# Patient Record
Sex: FEMALE | Race: WHITE | HISPANIC OR LATINO | Employment: UNEMPLOYED | URBAN - METROPOLITAN AREA
[De-identification: names, ages, dates, MRNs, and addresses within clinical notes are randomized per-mention and may not be internally consistent; named-entity substitution may affect disease eponyms.]

---

## 2023-09-27 ENCOUNTER — HOSPITAL ENCOUNTER (OUTPATIENT)
Facility: CLINIC | Age: 28
End: 2023-09-27
Admitting: OBSTETRICS & GYNECOLOGY
Payer: MEDICAID

## 2023-09-27 ENCOUNTER — HOSPITAL ENCOUNTER (EMERGENCY)
Facility: CLINIC | Age: 28
End: 2023-09-27

## 2023-09-27 ENCOUNTER — HOSPITAL ENCOUNTER (OUTPATIENT)
Facility: CLINIC | Age: 28
Discharge: HOME | End: 2023-09-27
Attending: FAMILY MEDICINE | Admitting: OBSTETRICS & GYNECOLOGY
Payer: MEDICAID

## 2023-09-27 VITALS
OXYGEN SATURATION: 100 % | HEART RATE: 98 BPM | SYSTOLIC BLOOD PRESSURE: 139 MMHG | RESPIRATION RATE: 16 BRPM | DIASTOLIC BLOOD PRESSURE: 89 MMHG | WEIGHT: 185 LBS | HEIGHT: 62 IN | TEMPERATURE: 98.1 F | BODY MASS INDEX: 34.04 KG/M2

## 2023-09-27 VITALS — TEMPERATURE: 98.3 F | RESPIRATION RATE: 18 BRPM | DIASTOLIC BLOOD PRESSURE: 71 MMHG | SYSTOLIC BLOOD PRESSURE: 113 MMHG

## 2023-09-27 DIAGNOSIS — N39.0 URINARY TRACT INFECTION WITHOUT HEMATURIA, SITE UNSPECIFIED: Primary | ICD-10-CM

## 2023-09-27 PROBLEM — Z36.89 ENCOUNTER FOR TRIAGE IN PREGNANT PATIENT: Status: ACTIVE | Noted: 2023-09-27

## 2023-09-27 LAB
ALBUMIN UR-MCNC: NEGATIVE MG/DL
AMORPH CRY #/AREA URNS HPF: ABNORMAL /HPF
APPEARANCE UR: ABNORMAL
BACTERIA #/AREA URNS HPF: ABNORMAL /HPF
BILIRUB UR QL STRIP: NEGATIVE
COLOR UR AUTO: ABNORMAL
GLUCOSE UR STRIP-MCNC: NEGATIVE MG/DL
HGB UR QL STRIP: NEGATIVE
KETONES UR STRIP-MCNC: NEGATIVE MG/DL
LEUKOCYTE ESTERASE UR QL STRIP: NEGATIVE
MUCOUS THREADS #/AREA URNS LPF: PRESENT /LPF
NITRATE UR QL: POSITIVE
PH UR STRIP: 7 [PH] (ref 5–7)
RBC URINE: 1 /HPF
SP GR UR STRIP: 1.01 (ref 1–1.03)
SQUAMOUS EPITHELIAL: 1 /HPF
UROBILINOGEN UR STRIP-MCNC: <2 MG/DL
WBC URINE: 3 /HPF

## 2023-09-27 PROCEDURE — 87086 URINE CULTURE/COLONY COUNT: CPT

## 2023-09-27 PROCEDURE — 81001 URINALYSIS AUTO W/SCOPE: CPT

## 2023-09-27 RX ORDER — FAMOTIDINE 20 MG
1 TABLET ORAL DAILY
COMMUNITY

## 2023-09-27 RX ORDER — CHLORAL HYDRATE 500 MG
2 CAPSULE ORAL DAILY
COMMUNITY

## 2023-09-27 RX ORDER — LIDOCAINE 40 MG/G
CREAM TOPICAL
Status: DISCONTINUED | OUTPATIENT
Start: 2023-09-27 | End: 2023-09-27 | Stop reason: HOSPADM

## 2023-09-27 RX ORDER — ASPIRIN 81 MG/1
81 TABLET ORAL DAILY
COMMUNITY

## 2023-09-27 RX ORDER — CEPHALEXIN 500 MG/1
500 CAPSULE ORAL 2 TIMES DAILY
Qty: 14 CAPSULE | Refills: 0 | Status: SHIPPED | OUTPATIENT
Start: 2023-09-27

## 2023-09-27 ASSESSMENT — ACTIVITIES OF DAILY LIVING (ADL): ADLS_ACUITY_SCORE: 31

## 2023-09-27 NOTE — LETTER
September 29, 2023      Brittany Ramos  [address] [place] [address]        Dear ,    We are writing to inform you of your test results.    Please make an appointment with your provider to review or follow up on your test results.      Resulted Orders   Urine Culture   Result Value Ref Range    Culture >100,000 CFU/mL Klebsiella pneumoniae (A)        If you have any questions or concerns, please call the clinic at the number listed above.       Sincerely,      Jagjit Jeffers MD

## 2023-09-27 NOTE — PROGRESS NOTES
Pt arrives to [facility] from home and is ambulatory and accompanied by her . She is complaining of dizziness, weakness, black spots in her vision,  and cramping. She denies any leaking of fluid, vaginal bleeding, and states her baby is moving normally. She states she was due in clinic today for a repeated UA/UC. She is placed on the monitor for NST and VS are taken.

## 2023-09-27 NOTE — LETTER
September 28, 2023      Brittany Ramos  [address] [place] [address]        Dear ,    We are writing to inform you of your test results.    Please make an appointment with your provider to review or follow up on your test results.      Resulted Orders   Urine Culture   Result Value Ref Range    Culture >100,000 CFU/mL Klebsiella pneumoniae (A)        If you have any questions or concerns, please call the clinic at the number listed above.       Sincerely,      Jagjit Jeffers MD

## 2023-09-27 NOTE — PROVIDER NOTIFICATION
"Spoke with OB Resident Dr Jeffers regarding pt's arrival to [facility] for c/o dizziness, and vision changes \"black spots\", as well as cramping. OB resident to round on pt and consult their facility, pt in agreement with that plan of care.   "

## 2023-09-27 NOTE — DISCHARGE INSTRUCTIONS
Discharge Instruction for Undelivered Patients      You were seen for:  uti symptoms  We Consulted: Dr. Hanley  You had (Test or Medicine):urine analysis     Diet:   Drink 8 to 12 glasses of liquids (milk, juice, water) every day.  You may eat meals and snacks.     Activity:  Call your doctor or nurse midwife if your baby is moving less than usual.     Call your provider if you notice:  Swelling in your face or increased swelling in your hands or legs.  Headaches that are not relieved by Tylenol (acetaminophen).  Changes in your vision (blurring: seeing spots or stars.)  Nausea (sick to your stomach) and vomiting (throwing up).   Weight gain of 5 pounds or more per week.  Heartburn that doesn't go away.  Signs of bladder infection: pain when you urinate (use the toilet), need to go more often and more urgently.  The bag of ochoa (rupture of membranes) breaks, or you notice leaking in your underwear.  Bright red blood in your underwear.  Abdominal (lower belly) or stomach pain.  For first baby: Contractions (tightening) less than 5 minutes apart for one hour or more.  Second (plus) baby: Contractions (tightening) less than 10 minutes apart and getting stronger.  *If less than 34 weeks: Contractions (tightening) more than 6 times in one hour.  Increase or change in vaginal discharge (note the color and amount)    Follow-up:  Tomorrow with your provider

## 2023-09-27 NOTE — PROGRESS NOTES
OB Triage Note        Assessment and Plan:     Brittany Ramos is a 28 year old  at 31w2d dizziness and weakness started today at 2 PM.  Her prenatal course has been complicated by previous urinary tract infections treated with antibiotics x2.  It is likely that her current symptoms may be related to dehydration secondary to UTI.    Patient Active Problem List   Diagnosis    Encounter for triage in pregnant patient       UA positive for nitrates but negative for leukocyte esterase; given presenting symptoms and history of UTIs will empirically treat with Keflex 7 days 500 mg twice daily and have patient follow-up at [facility] tomorrow  Reassuring NST category 1    Patient discussed with attending physician, Dr. Jesus Hanley , who agrees with the plan.      Jagjit Jeffers MD PGY1 2023  [facility] Residency [facility]       Subjective:     Brittany Ramos is a  28 year old female at 31w2d with an overall unremarkable prenatal history who presents to OB triage with dizziness and lightheadedness.  Brittany Ramos is a patient at [facility] and seen multiple OB providers there since finding out she was pregnant.     She states that she has had 3 urinary tract infections this pregnancy and has been treated twice for it with a 1 week course of antibiotics.  She is unsure what antibiotic she has taken but says that at her most recent OB appointment on , she provided a urine sample and was told it was positive however they were unable to culture it.  She was going to provide a urine sample again today however states that around 2 PM, she was sitting and felt extremely lightheaded and dizzy with some vision changes.  Symptoms improved when she laid down however was concerned enough to come to triage to be evaluated.  She denies any burning while peeing, blood in urine, vaginal bleeding.  She does endorse some occasional discharge but no abrupt loss of  "fluids.  States that she has had wet preps during this pregnancy that were negative.  Patient also has lower abdominal cramping sensation however denies any upper abdominal pain.  No nausea/vomiting, no significant lower extremity swelling.     She is currently on a baby aspirin however states that she has never been told there is concern for preeclampsia or issues with her blood pressures.       Prenatal labs:   No results found for: ABO, RH, AS, HEPBANG, CHPCRT, GCPCRT, TREPAB, RUBELLAABIGG, HGB, HIV, GBS       Review of Systems:   CONSTITUTIONAL: no fatigue, no unexpected change in weight  SKIN: no worrisome rashes or lesions  EYES: no acute vision problems or changes  ENT: no ear problems, no mouth problems, no throat problems  RESP: no significant cough, no shortness of breath  CV: no chest pain, no palpitations, no new or worsening peripheral edema  GI: no nausea, no vomiting, no constipation, no diarrhea  : no frequency, no dysuria, no hematuria  NEURO: Dizziness, and weakness.   ENDOCRINE: no temperature intolerance, no skin/hair changes  PSYCHIATRIC: NEGATIVE for changes in mood or trouble with sleep         Physical Exam:   Vitals:   /71 (BP Location: Right arm, Patient Position: Semi-Elder's, Cuff Size: Adult Regular)   Temp 98.3  F (36.8  C) (Oral)   Resp 18   0 lbs 0 oz  Estimated body mass index is 33.84 kg/m  as calculated from the following:    Height as of an earlier encounter on 9/27/23: 1.575 m (5' 2\").    Weight as of an earlier encounter on 9/27/23: 83.9 kg (185 lb).    GEN: Awake, alert in no apparent distress   HEENT: grossly normal  NECK: no lymphadenopathy or thryoidomegaly  RESPIRATORY: clear to auscultation bilaterally, no increased work of breathing  BACK:  no costovertebral angle tenderness   CARDIOVASCULAR: RRR, no murmur  ABDOMEN: gravid  PELVIC:  no fluid noted, no blood noted.    EXT:  no edema or calf tenderness    NST interpretation:   Baseline rate 145 " normal  Accelerations present  Decelerations not present  Interpretation: reactive    Labs today:  No results found for any visits on 09/27/23.

## 2023-09-29 LAB — BACTERIA UR CULT: ABNORMAL

## 2024-10-14 ENCOUNTER — HOSPITAL ENCOUNTER (EMERGENCY)
Facility: CLINIC | Age: 29
Discharge: HOME | End: 2024-10-15
Attending: EMERGENCY MEDICINE | Admitting: EMERGENCY MEDICINE
Payer: COMMERCIAL

## 2024-10-14 DIAGNOSIS — T88.7XXA MEDICATION SIDE EFFECTS: ICD-10-CM

## 2024-10-14 DIAGNOSIS — R53.1 WEAKNESS: ICD-10-CM

## 2024-10-14 PROCEDURE — 99284 EMERGENCY DEPT VISIT MOD MDM: CPT | Mod: 25

## 2024-10-14 ASSESSMENT — COLUMBIA-SUICIDE SEVERITY RATING SCALE - C-SSRS
6. HAVE YOU EVER DONE ANYTHING, STARTED TO DO ANYTHING, OR PREPARED TO DO ANYTHING TO END YOUR LIFE?: NO
1. IN THE PAST MONTH, HAVE YOU WISHED YOU WERE DEAD OR WISHED YOU COULD GO TO SLEEP AND NOT WAKE UP?: NO
2. HAVE YOU ACTUALLY HAD ANY THOUGHTS OF KILLING YOURSELF IN THE PAST MONTH?: NO

## 2024-10-15 VITALS
SYSTOLIC BLOOD PRESSURE: 118 MMHG | OXYGEN SATURATION: 97 % | RESPIRATION RATE: 14 BRPM | BODY MASS INDEX: 34.04 KG/M2 | WEIGHT: 185 LBS | TEMPERATURE: 98.1 F | DIASTOLIC BLOOD PRESSURE: 66 MMHG | HEIGHT: 62 IN | HEART RATE: 76 BPM

## 2024-10-15 LAB
ALBUMIN SERPL BCG-MCNC: 4.2 G/DL (ref 3.5–5.2)
ALBUMIN UR-MCNC: 20 MG/DL
ALP SERPL-CCNC: 72 U/L (ref 40–150)
ALT SERPL W P-5'-P-CCNC: 28 U/L (ref 0–50)
ANION GAP SERPL CALCULATED.3IONS-SCNC: 15 MMOL/L (ref 7–15)
APPEARANCE UR: CLEAR
AST SERPL W P-5'-P-CCNC: 22 U/L (ref 0–45)
BASOPHILS # BLD AUTO: 0 10E3/UL (ref 0–0.2)
BASOPHILS NFR BLD AUTO: 0 %
BILIRUB SERPL-MCNC: 0.5 MG/DL
BILIRUB UR QL STRIP: NEGATIVE
BUN SERPL-MCNC: 11.2 MG/DL (ref 6–20)
CALCIUM SERPL-MCNC: 8.5 MG/DL (ref 8.8–10.4)
CHLORIDE SERPL-SCNC: 104 MMOL/L (ref 98–107)
COLOR UR AUTO: YELLOW
CREAT SERPL-MCNC: 0.71 MG/DL (ref 0.51–0.95)
EGFRCR SERPLBLD CKD-EPI 2021: >90 ML/MIN/1.73M2
EOSINOPHIL # BLD AUTO: 0.1 10E3/UL (ref 0–0.7)
EOSINOPHIL NFR BLD AUTO: 2 %
ERYTHROCYTE [DISTWIDTH] IN BLOOD BY AUTOMATED COUNT: 13.6 % (ref 10–15)
GLUCOSE SERPL-MCNC: 107 MG/DL (ref 70–99)
GLUCOSE UR STRIP-MCNC: NEGATIVE MG/DL
HCG SERPL QL: NEGATIVE
HCO3 SERPL-SCNC: 20 MMOL/L (ref 22–29)
HCT VFR BLD AUTO: 37 % (ref 35–47)
HGB BLD-MCNC: 12.5 G/DL (ref 11.7–15.7)
HGB UR QL STRIP: NEGATIVE
HYALINE CASTS: 1 /LPF
IMM GRANULOCYTES # BLD: 0.1 10E3/UL
IMM GRANULOCYTES NFR BLD: 1 %
KETONES UR STRIP-MCNC: NEGATIVE MG/DL
LEUKOCYTE ESTERASE UR QL STRIP: NEGATIVE
LYMPHOCYTES # BLD AUTO: 1.7 10E3/UL (ref 0.8–5.3)
LYMPHOCYTES NFR BLD AUTO: 21 %
MAGNESIUM SERPL-MCNC: 1.9 MG/DL (ref 1.7–2.3)
MCH RBC QN AUTO: 29.3 PG (ref 26.5–33)
MCHC RBC AUTO-ENTMCNC: 33.8 G/DL (ref 31.5–36.5)
MCV RBC AUTO: 87 FL (ref 78–100)
MONOCYTES # BLD AUTO: 0.8 10E3/UL (ref 0–1.3)
MONOCYTES NFR BLD AUTO: 10 %
MUCOUS THREADS #/AREA URNS LPF: PRESENT /LPF
NEUTROPHILS # BLD AUTO: 5.4 10E3/UL (ref 1.6–8.3)
NEUTROPHILS NFR BLD AUTO: 67 %
NITRATE UR QL: NEGATIVE
NRBC # BLD AUTO: 0 10E3/UL
NRBC BLD AUTO-RTO: 0 /100
PH UR STRIP: 5.5 [PH] (ref 5–7)
PLATELET # BLD AUTO: 347 10E3/UL (ref 150–450)
POTASSIUM SERPL-SCNC: 4 MMOL/L (ref 3.4–5.3)
PROT SERPL-MCNC: 7.2 G/DL (ref 6.4–8.3)
RBC # BLD AUTO: 4.26 10E6/UL (ref 3.8–5.2)
RBC URINE: 5 /HPF
SODIUM SERPL-SCNC: 139 MMOL/L (ref 135–145)
SP GR UR STRIP: 1.03 (ref 1–1.03)
SQUAMOUS EPITHELIAL: 2 /HPF
UROBILINOGEN UR STRIP-MCNC: <2 MG/DL
WBC # BLD AUTO: 8.1 10E3/UL (ref 4–11)
WBC URINE: 1 /HPF

## 2024-10-15 PROCEDURE — 250N000013 HC RX MED GY IP 250 OP 250 PS 637: Performed by: EMERGENCY MEDICINE

## 2024-10-15 PROCEDURE — 85025 COMPLETE CBC W/AUTO DIFF WBC: CPT | Performed by: EMERGENCY MEDICINE

## 2024-10-15 PROCEDURE — 80053 COMPREHEN METABOLIC PANEL: CPT | Performed by: EMERGENCY MEDICINE

## 2024-10-15 PROCEDURE — 84703 CHORIONIC GONADOTROPIN ASSAY: CPT | Performed by: EMERGENCY MEDICINE

## 2024-10-15 PROCEDURE — 83735 ASSAY OF MAGNESIUM: CPT | Performed by: EMERGENCY MEDICINE

## 2024-10-15 PROCEDURE — 96374 THER/PROPH/DIAG INJ IV PUSH: CPT | Mod: 59

## 2024-10-15 PROCEDURE — 250N000011 HC RX IP 250 OP 636: Performed by: EMERGENCY MEDICINE

## 2024-10-15 PROCEDURE — 36415 COLL VENOUS BLD VENIPUNCTURE: CPT | Performed by: EMERGENCY MEDICINE

## 2024-10-15 PROCEDURE — 81001 URINALYSIS AUTO W/SCOPE: CPT | Performed by: EMERGENCY MEDICINE

## 2024-10-15 RX ORDER — CARBOXYMETHYLCELLULOSE SODIUM 5 MG/ML
1 SOLUTION/ DROPS OPHTHALMIC
Status: DISCONTINUED | OUTPATIENT
Start: 2024-10-15 | End: 2024-10-15 | Stop reason: HOSPADM

## 2024-10-15 RX ORDER — ONDANSETRON 2 MG/ML
4 INJECTION INTRAMUSCULAR; INTRAVENOUS ONCE
Status: COMPLETED | OUTPATIENT
Start: 2024-10-15 | End: 2024-10-15

## 2024-10-15 RX ADMIN — ONDANSETRON 4 MG: 2 INJECTION INTRAMUSCULAR; INTRAVENOUS at 01:05

## 2024-10-15 RX ADMIN — Medication 1 DROP: at 01:25

## 2024-10-15 ASSESSMENT — ACTIVITIES OF DAILY LIVING (ADL)
ADLS_ACUITY_SCORE: 35
ADLS_ACUITY_SCORE: 35

## 2024-10-15 NOTE — ED TRIAGE NOTES
Pt arrives with new onset of sudden weakness one hour ago. Pt had an outpatient surgery to her right eye at 0800 this morning. Pt reports her whole body feels heavy and generally weak.  Pt denies nausea or injury.  Pt appears quite tired but is alert and oriented and able to answer questions.  Pt is taking vicodin post op with last dose at 21:00

## 2024-10-15 NOTE — ED PROVIDER NOTES
Emergency Department Encounter      NAME: Brittany Ramos  AGE: 29 year old female  YOB: 1995  MRN: [id number]  EVALUATION DATE & TIME: 10/14/2024 11:48 PM    PCP: No Ref-Primary, Physician    ED PROVIDER: Anselmo Burr M.D.      Chief Complaint   Patient presents with    Generalized Weakness         FINAL IMPRESSION:  1. Weakness    2. Medication side effects        MEDICAL DECISION MAKIN:20 AM Per ED tech, patient stated that she couldn't walk from the wheelchair to the hospital bed because she was really weak all over so her  picked her up and placed her on the bed.  12:25 AM I met with the patient, obtained history, performed an initial exam, and discussed options and plan for diagnostics and treatment here in the ED.   2:37 AM Rechecked and updated patient on lab results. Discussed plan for discharge.     This patient is a 29-year-old female who was previously well presents with generalized weakness.  She had just had LASEK surgery done to correct astigmatism in her right eye earlier today.  She had taken a total of 3 hydrocodone for the pain.  Over the day while she was taking the she is noticed increasing generalized weakness which began 30 minutes after taking the medication.  She has been having some nausea as well.  On my exam the patient appeared to be experiencing some exaggerated effects from the hydrocodone.  Her nausea was treated with Zofran in the ER and she received some artificial tears.  She was observed in the ER and seemed improved with time.  She is going to stop using the hydrocodone as I think this is the cause of her symptoms that she has.  Instead she will use Tylenol for the pain.      Pertinent Labs & Imaging studies reviewed. (See chart for details)    The importance of close follow up was discussed. We reviewed warning signs and symptoms, and I instructed Ms. Ramos to return to the emergency department immediately if she develops any new or worsening  "symptoms. I provided additional verbal discharge instructions. Ms. Ramos expressed understanding and agreement with this plan of care, her questions were answered, and she was discharged in stable condition.     Medical Decision Making     History:  Supplemental history from: Documented in chart, if applicable  External Record(s) reviewed: Documented in chart, if applicable.     Work Up:  Chart documentation includes differential considered and any EKGs or imaging independently interpreted by provider, where specified.  In additional to work up documented, I considered the following work up: Documented in chart, if applicable.     External consultation:  Discussion of management with another provider: Documented in chart, if applicable     Complicating factors:  Care impacted by chronic illness: N/A  Care affected by social determinants of health: Access to Medical Care     Disposition considerations: Discharged home with no prescription medications      MEDICATIONS GIVEN IN THE EMERGENCY:  Medications   ondansetron (ZOFRAN) injection 4 mg (4 mg Intravenous $Given 10/15/24 0105)       NEW PRESCRIPTIONS STARTED AT TODAY'S ER VISIT:  Discharge Medication List as of 10/15/2024  2:38 AM             =================================================================    HPI    Patient information was obtained from: Patient, her     Use of : N/A        Brittany Ramos is a 29 year old female with no past medical history, who presents with sudden onset of generalized weakness all over.    Patient reports she got LASIX eye surgery one year ago to correct her astigmatism and notes they performed a \"cross-linking\" procedure.  She had an outpatient surgery to her right eye at 0800 this morning. She underwent correction surgery in her right eye today following the LASIX surgery because she had keratoconus causing astigmatism. States Dr. Hanson from [facility] was the surgeon who performed this surgery. " "She reports taking hydrocodone that she was prescribed for the pain in her eyes following this surgery and first took it at 9 PM. She ate food afterwards and after 30 minutes she felt really weak all over. States she really couldn't move, especially her upper body (specifically her head, arms, and mouth). She was prescribed to take 1 pill every 6 hours. She took 3 hydrocodone tablets today, per her . She denies having these symptoms prior to taking the hydrocodone. Reports her jaw feels \"heavy' because she doesn't have much strength in her jaw. She feel nauseated intermittently and when she feels nauseated, she develops the generalized weakness again. She started feeling generally weak one hour PTA. She denies any falls or eye pain. She does endorse blurry vision and notes her eyes feel \"watery\". She was given a few different eye drops after the surgery today and doesn't recall the names of them.    Patient states she couldn't get off her wheelchair and walk to the bed in the ER she \"couldn't\" do it due to the weakness. She didn't take eat any unusual food or supplements. No other reported complaints or concerns at this time.      REVIEW OF SYSTEMS   Review of Systems - Refer to HPI, otherwise negative    PAST MEDICAL HISTORY:  Past Medical History:   Diagnosis Date    S/P LASIK surgery 03/2023       PAST SURGICAL HISTORY:  No past surgical history on file.    CURRENT MEDICATIONS:    No current facility-administered medications for this encounter.    Current Outpatient Medications:     aspirin 81 MG EC tablet, Take 81 mg by mouth daily, Disp: , Rfl:     cephALEXin (KEFLEX) 500 MG capsule, Take 1 capsule (500 mg) by mouth 2 times daily, Disp: 14 capsule, Rfl: 0    fish oil-omega-3 fatty acids 1000 MG capsule, Take 2 g by mouth daily, Disp: , Rfl:     Prenatal Vit-Fe Fumarate-FA (PRENATAL MULTIVITAMIN  PLUS IRON) 27-1 MG TABS, Take 1 tablet by mouth daily, Disp: , Rfl:     Vitamin D, Cholecalciferol, 25 MCG " "(1000 UT) CAPS, Take 1 capsule by mouth daily, Disp: , Rfl:     ALLERGIES:  No Known Allergies    FAMILY HISTORY:  No family history on file.    SOCIAL HISTORY:   Social History     Socioeconomic History    Marital status:      Social Determinants of Health     Food Insecurity: No Food Insecurity (12/5/2023)    [facility]    Hunger Vital Sign     Worried About Running Out of Food in the Last Year: Never true     Ran Out of Food in the Last Year: Never true       PHYSICAL EXAM:    Vitals: /66   Pulse 76   Temp 98.1  F (36.7  C) (Oral)   Resp 14   Ht 1.575 m (5' 2\")   Wt 83.9 kg (185 lb)   LMP 09/30/2024   SpO2 97%   BMI 33.84 kg/m     Constitutional: Well developed, well nourished. Comfortable appearing  Quiet female wearing a right eye guard.  HEAD:Normocephalic, atraumatic,   Eyes: PERRLA, EOM intact, conjunctiva clear, no discharge  ENT: mucous membranes moist, nose normal.   Neck- Supple, gross ROM intact.  No JVD.  No palpable nodes.  Pulmonary: Clear to auscultation bilaterally, no respiratory distress, no wheezing, speaks full sentences easily.  Chest: No chest wall tenderness  Cardiovascular: Normal heart rate, regular rhythm, no murmurs. No lower extremity edema, 2+ DP pulses.   GI: Soft, no tenderness to deep palpation in all quadrants, not distended, no masses.  No hepatosplenomegaly.  Musculoskeletal: Moving all 4 extremities intentionally and without pain. No obvious deformity.  Back: No CVA tenderness  Skin: Warm, dry, no rash.  Neurologic: Alert & oriented x 3, speech clear, moving all extremities spontaneously. Decreased extremity strength likely due to patient's poor effort. Normal sensation.  Psychiatric: Affect normal, cooperative.     LAB:  All pertinent labs reviewed and interpreted.  Labs Ordered and Resulted from Time of ED Arrival to Time of ED Departure   COMPREHENSIVE METABOLIC PANEL - Abnormal       Result Value    Sodium 139      Potassium 4.0      " Carbon Dioxide (CO2) 20 (*)     Anion Gap 15      Urea Nitrogen 11.2      Creatinine 0.71      GFR Estimate >90      Calcium 8.5 (*)     Chloride 104      Glucose 107 (*)     Alkaline Phosphatase 72      AST 22      ALT 28      Protein Total 7.2      Albumin 4.2      Bilirubin Total 0.5     ROUTINE UA WITH MICROSCOPIC REFLEX TO CULTURE - Abnormal    Color Urine Yellow      Appearance Urine Clear      Glucose Urine Negative      Bilirubin Urine Negative      Ketones Urine Negative      Specific Gravity Urine 1.031 (*)     Blood Urine Negative      pH Urine 5.5      Protein Albumin Urine 20 (*)     Urobilinogen Urine <2.0      Nitrite Urine Negative      Leukocyte Esterase Urine Negative      Mucus Urine Present (*)     RBC Urine 5 (*)     WBC Urine 1      Squamous Epithelials Urine 2 (*)     Hyaline Casts Urine 1     MAGNESIUM - Normal    Magnesium 1.9     HCG QUALITATIVE PREGNANCY - Normal    hCG Serum Qualitative Negative     CBC WITH PLATELETS AND DIFFERENTIAL    WBC Count 8.1      RBC Count 4.26      Hemoglobin 12.5      Hematocrit 37.0      MCV 87      MCH 29.3      MCHC 33.8      RDW 13.6      Platelet Count 347      % Neutrophils 67      % Lymphocytes 21      % Monocytes 10      % Eosinophils 2      % Basophils 0      % Immature Granulocytes 1      NRBCs per 100 WBC 0      Absolute Neutrophils 5.4      Absolute Lymphocytes 1.7      Absolute Monocytes 0.8      Absolute Eosinophils 0.1      Absolute Basophils 0.0      Absolute Immature Granulocytes 0.1      Absolute NRBCs 0.0         RADIOLOGY:  No orders to display       EKG:   N/A    PROCEDURES:   Procedures       I, Adrienne Benjamin, am serving as a scribe to document services personally performed by Dr. Anselmo Burr based on my observation and the provider's statements to me. I, Anselmo Burr M.D. attest that Adrienne Benjamin is acting in a scribe capacity, has observed my performance of the services and has documented them in accordance with my  direction.      Anselmo Burr M.D.  Emergency Medicine  [facility] EMERGENCY ROOM  [address] [id number] [phone]  Dept: [phone]      Anselmo Burr MD  10/30/24 0752

## 2024-10-15 NOTE — DISCHARGE INSTRUCTIONS
Decrease or stop using the hydrocodone for your pain.  I think that this is what caused your symptoms today.  You can try Tylenol for pain.    You should contact your doctor or your surgeon to discuss your symptoms.